# Patient Record
Sex: FEMALE | Race: WHITE
[De-identification: names, ages, dates, MRNs, and addresses within clinical notes are randomized per-mention and may not be internally consistent; named-entity substitution may affect disease eponyms.]

---

## 2019-06-27 LAB
BASOPHILS NFR BLD AUTO: 0.2 % (ref 0–2)
EOSINOPHIL NFR BLD: 1.7 % (ref 0–7)
ERYTHROCYTE [DISTWIDTH] IN BLOOD BY AUTOMATED COUNT: 13.3 % (ref 11.5–14.5)
HCT VFR BLD CALC: 46.1 % (ref 36–48)
HGB BLD-MCNC: 16.4 G/DL (ref 12–16)
IMM GRANULOCYTES NFR BLD: 0.1 % (ref 0–5)
LYMPHOCYTES NFR BLD AUTO: 21.8 % (ref 15–50)
MCH RBC QN AUTO: 33.3 PG (ref 26–34)
MCHC RBC AUTO-ENTMCNC: 35.6 G/DL (ref 31–37)
MCV RBC: 93.7 FL (ref 80–100)
MONOCYTES NFR BLD: 4.9 % (ref 2–11)
NEUTROPHILS NFR BLD AUTO: 71.3 % (ref 40–80)
PLATELET # BLD: 283 10X3/UL (ref 130–400)
PMV BLD AUTO: 9.1 FL (ref 7.4–10.4)
RBC # BLD AUTO: 4.92 10X6/UL (ref 4–5.4)
WBC # BLD AUTO: 10.3 10X3/UL (ref 4.8–10.8)

## 2019-06-28 ENCOUNTER — HOSPITAL ENCOUNTER (OUTPATIENT)
Dept: HOSPITAL 84 - D.PAN | Age: 24
Discharge: HOME | End: 2019-06-28
Attending: OBSTETRICS & GYNECOLOGY
Payer: MEDICAID

## 2019-06-28 VITALS
BODY MASS INDEX: 27.66 KG/M2 | WEIGHT: 150.32 LBS | HEIGHT: 62 IN | WEIGHT: 150.32 LBS | BODY MASS INDEX: 27.66 KG/M2 | BODY MASS INDEX: 27.66 KG/M2 | HEIGHT: 62 IN

## 2019-06-28 VITALS — SYSTOLIC BLOOD PRESSURE: 110 MMHG | DIASTOLIC BLOOD PRESSURE: 70 MMHG

## 2019-06-28 DIAGNOSIS — N83.202: Primary | ICD-10-CM

## 2019-06-28 DIAGNOSIS — R10.2: ICD-10-CM

## 2019-06-28 LAB — HCG UR QL: NEGATIVE

## 2019-06-29 ENCOUNTER — HOSPITAL ENCOUNTER (EMERGENCY)
Dept: HOSPITAL 84 - D.ER | Age: 24
Discharge: HOME | End: 2019-06-29
Payer: MEDICAID

## 2019-06-29 VITALS — SYSTOLIC BLOOD PRESSURE: 130 MMHG | DIASTOLIC BLOOD PRESSURE: 80 MMHG

## 2019-06-29 VITALS — WEIGHT: 150.32 LBS | BODY MASS INDEX: 27.66 KG/M2 | HEIGHT: 62 IN

## 2019-06-29 DIAGNOSIS — J95.89: Primary | ICD-10-CM

## 2019-06-29 DIAGNOSIS — R05: ICD-10-CM

## 2019-06-29 LAB
ALBUMIN SERPL-MCNC: 3.2 G/DL (ref 3.4–5)
ALP SERPL-CCNC: 94 U/L (ref 46–116)
ALT SERPL-CCNC: 39 U/L (ref 10–68)
AMYLASE SERPL-CCNC: 33 U/L (ref 25–115)
ANION GAP SERPL CALC-SCNC: 11.5 MMOL/L (ref 8–16)
APPEARANCE UR: (no result)
BACTERIA #/AREA URNS HPF: (no result) /HPF
BASOPHILS NFR BLD AUTO: 0.2 % (ref 0–2)
BILIRUB SERPL-MCNC: 0.15 MG/DL (ref 0.2–1.3)
BILIRUB SERPL-MCNC: NEGATIVE MG/DL
BUN SERPL-MCNC: 10 MG/DL (ref 7–18)
CALCIUM SERPL-MCNC: 8.5 MG/DL (ref 8.5–10.1)
CHLORIDE SERPL-SCNC: 106 MMOL/L (ref 98–107)
CO2 SERPL-SCNC: 26.6 MMOL/L (ref 21–32)
COLOR UR: YELLOW
CREAT SERPL-MCNC: 0.8 MG/DL (ref 0.6–1.3)
EOSINOPHIL NFR BLD: 0.7 % (ref 0–7)
ERYTHROCYTE [DISTWIDTH] IN BLOOD BY AUTOMATED COUNT: 12.8 % (ref 11.5–14.5)
GLOBULIN SER-MCNC: 3.5 G/L
GLUCOSE SERPL-MCNC: 94 MG/DL (ref 74–106)
GLUCOSE SERPL-MCNC: NEGATIVE MG/DL
HCT VFR BLD CALC: 40.8 % (ref 36–48)
HGB BLD-MCNC: 14.1 G/DL (ref 12–16)
IMM GRANULOCYTES NFR BLD: 0.2 % (ref 0–5)
KETONES UR STRIP-MCNC: NEGATIVE MG/DL
LIPASE SERPL-CCNC: 125 U/L (ref 73–393)
LYMPHOCYTES NFR BLD AUTO: 28.4 % (ref 15–50)
MCH RBC QN AUTO: 32.5 PG (ref 26–34)
MCHC RBC AUTO-ENTMCNC: 34.6 G/DL (ref 31–37)
MCV RBC: 94 FL (ref 80–100)
MONOCYTES NFR BLD: 4.4 % (ref 2–11)
MUCOUS THREADS #/AREA URNS LPF: (no result) /LPF
NEUTROPHILS NFR BLD AUTO: 66.1 % (ref 40–80)
NITRITE UR-MCNC: NEGATIVE MG/ML
OSMOLALITY SERPL CALC.SUM OF ELEC: 279 MOSM/KG (ref 275–300)
PH UR STRIP: 6 [PH] (ref 5–6)
PLATELET # BLD: 258 10X3/UL (ref 130–400)
PMV BLD AUTO: 8.8 FL (ref 7.4–10.4)
POTASSIUM SERPL-SCNC: 3.1 MMOL/L (ref 3.5–5.1)
PROT SERPL-MCNC: 6.7 G/DL (ref 6.4–8.2)
PROT UR-MCNC: NEGATIVE MG/DL
RBC # BLD AUTO: 4.34 10X6/UL (ref 4–5.4)
RBC #/AREA URNS HPF: (no result) /HPF (ref 0–5)
SODIUM SERPL-SCNC: 141 MMOL/L (ref 136–145)
SP GR UR STRIP: 1.02 (ref 1–1.02)
SQUAMOUS #/AREA URNS HPF: (no result) /HPF (ref 0–5)
UROBILINOGEN UR-MCNC: NORMAL MG/DL
WBC # BLD AUTO: 15.8 10X3/UL (ref 4.8–10.8)
WBC #/AREA URNS HPF: (no result) /HPF (ref 0–5)

## 2019-07-12 ENCOUNTER — HOSPITAL ENCOUNTER (EMERGENCY)
Dept: HOSPITAL 84 - D.ER | Age: 24
Discharge: HOME | End: 2019-07-12
Payer: MEDICAID

## 2019-07-12 VITALS — WEIGHT: 150.32 LBS | HEIGHT: 62 IN | BODY MASS INDEX: 27.66 KG/M2

## 2019-07-12 VITALS — SYSTOLIC BLOOD PRESSURE: 109 MMHG | DIASTOLIC BLOOD PRESSURE: 67 MMHG

## 2019-07-12 DIAGNOSIS — Z98.890: ICD-10-CM

## 2019-07-12 DIAGNOSIS — R11.2: ICD-10-CM

## 2019-07-12 DIAGNOSIS — K52.9: Primary | ICD-10-CM

## 2019-07-12 LAB
ALBUMIN SERPL-MCNC: 3.8 G/DL (ref 3.4–5)
ALP SERPL-CCNC: 112 U/L (ref 46–116)
ALT SERPL-CCNC: 33 U/L (ref 10–68)
AMPHETAMINES UR QL SCN: NEGATIVE QUAL
AMYLASE SERPL-CCNC: 50 U/L (ref 25–115)
ANION GAP SERPL CALC-SCNC: 12.6 MMOL/L (ref 8–16)
APPEARANCE UR: (no result)
BACTERIA #/AREA URNS HPF: (no result) /HPF
BARBITURATES UR QL SCN: NEGATIVE QUAL
BASOPHILS NFR BLD AUTO: 0.4 % (ref 0–2)
BENZODIAZ UR QL SCN: NEGATIVE QUAL
BILIRUB SERPL-MCNC: 0.27 MG/DL (ref 0.2–1.3)
BILIRUB SERPL-MCNC: NEGATIVE MG/DL
BUN SERPL-MCNC: 11 MG/DL (ref 7–18)
BZE UR QL SCN: NEGATIVE QUAL
CALCIUM SERPL-MCNC: 8.9 MG/DL (ref 8.5–10.1)
CANNABINOIDS UR QL SCN: NEGATIVE QUAL
CHLORIDE SERPL-SCNC: 106 MMOL/L (ref 98–107)
CO2 SERPL-SCNC: 24.3 MMOL/L (ref 21–32)
COLOR UR: YELLOW
CREAT SERPL-MCNC: 0.7 MG/DL (ref 0.6–1.3)
EOSINOPHIL NFR BLD: 1.7 % (ref 0–7)
ERYTHROCYTE [DISTWIDTH] IN BLOOD BY AUTOMATED COUNT: 12.8 % (ref 11.5–14.5)
GLOBULIN SER-MCNC: 3.9 G/L
GLUCOSE SERPL-MCNC: 88 MG/DL (ref 74–106)
GLUCOSE SERPL-MCNC: NEGATIVE MG/DL
HCG UR QL: NEGATIVE
HCT VFR BLD CALC: 46.2 % (ref 36–48)
HGB BLD-MCNC: 16 G/DL (ref 12–16)
IMM GRANULOCYTES NFR BLD: 0.3 % (ref 0–5)
KETONES UR STRIP-MCNC: NEGATIVE MG/DL
LIPASE SERPL-CCNC: 174 U/L (ref 73–393)
LYMPHOCYTES NFR BLD AUTO: 26.8 % (ref 15–50)
MCH RBC QN AUTO: 32.6 PG (ref 26–34)
MCHC RBC AUTO-ENTMCNC: 34.6 G/DL (ref 31–37)
MCV RBC: 94.1 FL (ref 80–100)
MONOCYTES NFR BLD: 4.7 % (ref 2–11)
MUCOUS THREADS #/AREA URNS LPF: (no result) /LPF
NEUTROPHILS NFR BLD AUTO: 66.1 % (ref 40–80)
NITRITE UR-MCNC: NEGATIVE MG/ML
OPIATES UR QL SCN: NEGATIVE QUAL
OSMOLALITY SERPL CALC.SUM OF ELEC: 275 MOSM/KG (ref 275–300)
PCP UR QL SCN: NEGATIVE QUAL
PH UR STRIP: 5 [PH] (ref 5–6)
PLATELET # BLD: 263 10X3/UL (ref 130–400)
PMV BLD AUTO: 8.9 FL (ref 7.4–10.4)
POTASSIUM SERPL-SCNC: 3.9 MMOL/L (ref 3.5–5.1)
PROT SERPL-MCNC: 7.7 G/DL (ref 6.4–8.2)
PROT UR-MCNC: NEGATIVE MG/DL
RBC # BLD AUTO: 4.91 10X6/UL (ref 4–5.4)
RBC #/AREA URNS HPF: (no result) /HPF (ref 0–5)
SODIUM SERPL-SCNC: 139 MMOL/L (ref 136–145)
SP GR UR STRIP: 1.01 (ref 1–1.02)
SQUAMOUS #/AREA URNS HPF: (no result) /HPF (ref 0–5)
UROBILINOGEN UR-MCNC: NORMAL MG/DL
WBC # BLD AUTO: 11.3 10X3/UL (ref 4.8–10.8)
WBC #/AREA URNS HPF: (no result) /HPF (ref 0–5)

## 2020-08-11 ENCOUNTER — HOSPITAL ENCOUNTER (EMERGENCY)
Dept: HOSPITAL 84 - D.ER | Age: 25
Discharge: HOME | End: 2020-08-11
Payer: MEDICAID

## 2020-08-11 VITALS — WEIGHT: 148.31 LBS | BODY MASS INDEX: 27.29 KG/M2 | HEIGHT: 62 IN

## 2020-08-11 VITALS — DIASTOLIC BLOOD PRESSURE: 74 MMHG | SYSTOLIC BLOOD PRESSURE: 129 MMHG

## 2020-08-11 DIAGNOSIS — E83.51: ICD-10-CM

## 2020-08-11 DIAGNOSIS — D72.829: ICD-10-CM

## 2020-08-11 DIAGNOSIS — N93.9: ICD-10-CM

## 2020-08-11 DIAGNOSIS — O03.9: Primary | ICD-10-CM

## 2020-08-11 LAB
ALBUMIN SERPL-MCNC: 3.5 G/DL (ref 3.4–5)
ALP SERPL-CCNC: 107 U/L (ref 30–120)
ALT SERPL-CCNC: 21 U/L (ref 10–68)
ANION GAP SERPL CALC-SCNC: 8.3 MMOL/L (ref 8–16)
BACTERIA #/AREA URNS HPF: (no result) /HPF
BASOPHILS NFR BLD AUTO: 0.2 % (ref 0–2)
BILIRUB SERPL-MCNC: 0.44 MG/DL (ref 0.2–1.3)
BILIRUB SERPL-MCNC: NEGATIVE MG/DL
BUN SERPL-MCNC: 12 MG/DL (ref 7–18)
CALCIUM SERPL-MCNC: 8 MG/DL (ref 8.5–10.1)
CHLORIDE SERPL-SCNC: 106 MMOL/L (ref 98–107)
CO2 SERPL-SCNC: 29.6 MMOL/L (ref 21–32)
CREAT SERPL-MCNC: 0.7 MG/DL (ref 0.6–1.3)
EOSINOPHIL NFR BLD: 1.2 % (ref 0–7)
ERYTHROCYTE [DISTWIDTH] IN BLOOD BY AUTOMATED COUNT: 12.8 % (ref 11.5–14.5)
GLOBULIN SER-MCNC: 3.4 G/L
GLUCOSE SERPL-MCNC: 77 MG/DL (ref 74–106)
GLUCOSE SERPL-MCNC: NEGATIVE MG/DL
HCG SERPL-ACNC: 0 MIU/ML
HCT VFR BLD CALC: 43.1 % (ref 36–48)
HGB BLD-MCNC: 14.5 G/DL (ref 12–16)
IMM GRANULOCYTES NFR BLD: 0.3 % (ref 0–5)
KETONES UR STRIP-MCNC: NEGATIVE MG/DL
LYMPHOCYTES NFR BLD AUTO: 14.1 % (ref 15–50)
MCH RBC QN AUTO: 32.7 PG (ref 26–34)
MCHC RBC AUTO-ENTMCNC: 33.6 G/DL (ref 31–37)
MCV RBC: 97.3 FL (ref 80–100)
MONOCYTES NFR BLD: 5.5 % (ref 2–11)
NEUTROPHILS NFR BLD AUTO: 78.7 % (ref 40–80)
NITRITE UR-MCNC: NEGATIVE MG/ML
OSMOLALITY SERPL CALC.SUM OF ELEC: 277 MOSM/KG (ref 275–300)
PH UR STRIP: 5 [PH] (ref 5–6)
PLATELET # BLD: 258 10X3/UL (ref 130–400)
PMV BLD AUTO: 8.5 FL (ref 7.4–10.4)
POTASSIUM SERPL-SCNC: 3.9 MMOL/L (ref 3.5–5.1)
PROT SERPL-MCNC: 6.9 G/DL (ref 6.4–8.2)
RBC # BLD AUTO: 4.43 10X6/UL (ref 4–5.4)
RBC #/AREA URNS HPF: (no result) /HPF (ref 0–5)
SODIUM SERPL-SCNC: 140 MMOL/L (ref 136–145)
SQUAMOUS #/AREA URNS HPF: (no result) /HPF (ref 0–5)
UROBILINOGEN UR-MCNC: NORMAL MG/DL
WBC # BLD AUTO: 14.5 10X3/UL (ref 4.8–10.8)
WBC #/AREA URNS HPF: (no result) /HPF

## 2020-09-18 ENCOUNTER — HOSPITAL ENCOUNTER (OUTPATIENT)
Dept: HOSPITAL 84 - D.OPS | Age: 25
Discharge: HOME | End: 2020-09-18
Attending: PODIATRIST
Payer: MEDICAID

## 2020-09-18 VITALS
HEIGHT: 62 IN | BODY MASS INDEX: 26.5 KG/M2 | BODY MASS INDEX: 26.5 KG/M2 | WEIGHT: 144 LBS | HEIGHT: 62 IN | WEIGHT: 144 LBS

## 2020-09-18 VITALS — DIASTOLIC BLOOD PRESSURE: 76 MMHG | SYSTOLIC BLOOD PRESSURE: 111 MMHG

## 2020-09-18 DIAGNOSIS — M25.774: ICD-10-CM

## 2020-09-18 DIAGNOSIS — M21.621: Primary | ICD-10-CM

## 2020-09-18 LAB
ERYTHROCYTE [DISTWIDTH] IN BLOOD BY AUTOMATED COUNT: 12.8 % (ref 11.5–14.5)
HCG SERPL-ACNC: POSITIVE M[IU]/ML
HCT VFR BLD CALC: 39.8 % (ref 36–48)
HGB BLD-MCNC: 13.3 G/DL (ref 12–16)
MCH RBC QN AUTO: 32.4 PG (ref 26–34)
MCHC RBC AUTO-ENTMCNC: 33.4 G/DL (ref 31–37)
MCV RBC: 97.1 FL (ref 80–100)
PLATELET # BLD: 273 10X3/UL (ref 130–400)
PMV BLD AUTO: 8.3 FL (ref 7.4–10.4)
RBC # BLD AUTO: 4.1 10X6/UL (ref 4–5.4)
WBC # BLD AUTO: 12.9 10X3/UL (ref 4.8–10.8)

## 2020-09-18 NOTE — OP
PATIENT NAME:  AMADA RUDD                          MEDICAL RECORD: [id number]
:[birth year]                                             LOCATION:D.OPS          
                                                         ADMISSION DATE:        
SURGEON:  GAVINO RODAS             
 
 
DATE OF OPERATION:  2020
 
SURGEON:  Gavino Rodas DPM
 
PREOPERATIVE DIAGNOSES:
1. Tailor's bunion, right foot.
2. Accessory ossicle, right great toe.
 
POSTOPERATIVE DIAGNOSES:
1.  Tailor's bunion, right foot.
2.  Accessory ossicle, right great toe.
 
PROCEDURE:
1. Tailor's bunionectomy, right foot.
2. Partial ostectomy fifth metatarsal head, right foot.
3. Excision of accessory ossicle, right great toe.
 
ANESTHESIA:  Local with monitored anesthesia care.
 
HEMOSTASIS:  Pneumatic ankle tourniquet inflated to 250 mmHg.
 
ESTIMATED BLOOD LOSS:  Minimal.
 
MATERIALS:  A 3-0 Vicryl and 4-0 nylon.
 
INJECTABLES:  A 20 cc of 0.5% bupivacaine plain.
 
The patient has longstanding history of pain associated with a tailor's bunion
deformity on the right foot and also a painful callus secondary to an accessory
ossicle in the plantar surface of the right great toe.  We have discussed the
proposed surgeries.  Risks and benefits were discussed.  Complications were
reviewed.  She was appropriately consented for the above-mentioned procedures.
 
DESCRIPTION OF PROCEDURE:  The patient was brought in the operating room and
placed on the operating table in supine position.  A timeout was called with Dr. Rodas, who identified the patient, the surgical site, and surgery to be
performed.  Once appropriate anesthesia was obtained, the foot was prepped and
draped in the usual aseptic manner.
 
Attention was directed to the lateral aspect of the right foot where a 3-cm
linear incision was made just over the lateral aspect of the fifth metatarsal
head.  This incision was carried deep to soft tissue with care being taken to
retract all vital neurovascular structures.  All bleeders were cauterized along
the way.  The periosteum was then reflected from the fifth metatarsophalangeal
joint, thus exposing the hypertrophied lateral eminence of the fifth metatarsal
head.  The plantar condyles were also noted to be hypertrophied.  Next,
utilizing a sagittal saw, the hypertrophied condyles were removed as well as the
hypertrophied lateral eminence.  All sharp bone edges were smoothed with a rasp.
 The surgical site was then irrigated with copious amounts of normal sterile
saline via bulb syringe.  The periosteum was then reapproximated and coapted
using 3-0 Vicryl.  The subQ was then reapproximated and coapted using 3-0 Vicryl
and the skin was reapproximated and coapted with 4-0 nylon.
 
 
 
OPERATIVE REPORT                               [id number]    AMADA RUDD        
 
 
 
PROCEDURE 2:  Excision of accessory ossicle, right foot.  Attention was directed
to the medial aspect of the right great toe where a 3-cm linear incision was
made.  This incision was carried deep to soft tissue with care being taken to
retract all vital neurovascular structures.  All bleeders were cauterized along
the way.  As the incision was deepened, an accessory ossicle was noted directly
underneath the callus.  This accessory ossicle was then removed utilizing both
sharp and blunt dissection.  The surgical site was then investigated for any
remaining pathological tissue and none was noted.  The ossicle was sent to
pathology for further analysis.  The surgical site was then irrigated with
copious amounts of normal sterile saline via bulb syringe.
 
The periosteum was reapproximated and coapted using 3-0 Vicryl.  The subQ was
reapproximated and coapted using 3-0 Vicryl.  The skin was reapproximated and
closed with a 4-0 nylon.  A dressing consisting of Xeroform, 4 x 4's, Kerlix,
and Ace bandage was applied to the to the right foot.  Capillary refill time
status post procedure was instantaneous.  The patient tolerated the procedure
and anesthesia well.  She left the operating room with vital signs stable and
capillary refill time intact.
 
The patient was discharged home with instructions to ice and elevate the right
foot.  She was provided a prescription for Norco 7.5/325.  She has my cell phone
number for any after hours difficulties and there are no complications with this
procedure.
 
TRANSINT:[id number] Voice Confirmation ID: [id number] DOCUMENT ID: [id number]
                                           
                                           GAVINO RODAS             
 
 
 
 
 
 
 
 
 
 
 
 
 
 
 
 
 
 
 
CC:                                                             [id number]
DICTATION DATE: 20     :     20      [facility] 
                                                                      20
[id number] [place] [address]

## 2021-02-24 ENCOUNTER — HOSPITAL ENCOUNTER (OUTPATIENT)
Dept: HOSPITAL 84 - D.LDO | Age: 26
LOS: 1 days | Discharge: HOME | End: 2021-02-25
Attending: OBSTETRICS & GYNECOLOGY
Payer: MEDICAID

## 2021-02-24 VITALS — BODY MASS INDEX: 26.4 KG/M2

## 2021-02-24 DIAGNOSIS — O36.8190: Primary | ICD-10-CM

## 2021-02-24 LAB
BACTERIA #/AREA URNS HPF: (no result) HPF
BILIRUB SERPL-MCNC: NEGATIVE MG/DL
FIBRONECTIN FETAL VAG QL: NEGATIVE
KETONES UR STRIP-MCNC: NEGATIVE MG/DL
NITRITE UR-MCNC: NEGATIVE MG/ML
PH UR STRIP: 6 [PH] (ref 5–8)
SQUAMOUS #/AREA URNS HPF: (no result) HPF (ref 0–4)
UROBILINOGEN UR-MCNC: NORMAL MG/DL (ref ?–2)
WBC #/AREA URNS HPF: (no result) HPF (ref 0–4)

## 2021-03-30 ENCOUNTER — HOSPITAL ENCOUNTER (OUTPATIENT)
Dept: HOSPITAL 84 - D.LDO | Age: 26
Discharge: HOME | End: 2021-03-30
Attending: OBSTETRICS & GYNECOLOGY
Payer: COMMERCIAL

## 2021-03-30 VITALS — BODY MASS INDEX: 26.4 KG/M2

## 2021-03-30 DIAGNOSIS — O26.899: Primary | ICD-10-CM

## 2021-04-07 ENCOUNTER — HOSPITAL ENCOUNTER (OUTPATIENT)
Dept: HOSPITAL 84 - D.US | Age: 26
Discharge: HOME | End: 2021-04-07
Attending: OBSTETRICS & GYNECOLOGY
Payer: MEDICAID

## 2021-04-07 VITALS — BODY MASS INDEX: 26.4 KG/M2

## 2021-04-07 DIAGNOSIS — R22.41: Primary | ICD-10-CM

## 2021-04-14 ENCOUNTER — HOSPITAL ENCOUNTER (OUTPATIENT)
Dept: HOSPITAL 84 - D.LDO | Age: 26
Discharge: HOME | End: 2021-04-14
Attending: OBSTETRICS & GYNECOLOGY
Payer: COMMERCIAL

## 2021-04-14 VITALS — BODY MASS INDEX: 26.4 KG/M2

## 2021-04-14 DIAGNOSIS — O47.9: Primary | ICD-10-CM

## 2021-04-27 ENCOUNTER — HOSPITAL ENCOUNTER (OUTPATIENT)
Dept: HOSPITAL 84 - D.LDO | Age: 26
Discharge: LEFT BEFORE DISCHARGE | End: 2021-04-27
Attending: OBSTETRICS & GYNECOLOGY
Payer: COMMERCIAL

## 2021-04-27 VITALS — BODY MASS INDEX: 26.4 KG/M2

## 2021-04-27 DIAGNOSIS — O47.9: Primary | ICD-10-CM

## 2021-05-05 ENCOUNTER — HOSPITAL ENCOUNTER (INPATIENT)
Dept: HOSPITAL 84 - D.LDO | Age: 26
LOS: 3 days | Discharge: HOME | End: 2021-05-08
Attending: OBSTETRICS & GYNECOLOGY | Admitting: OBSTETRICS & GYNECOLOGY
Payer: MEDICAID

## 2021-05-05 VITALS — BODY MASS INDEX: 34.78 KG/M2 | HEIGHT: 62 IN | BODY MASS INDEX: 34.78 KG/M2 | WEIGHT: 189 LBS

## 2021-05-05 DIAGNOSIS — Z3A.38: ICD-10-CM

## 2021-05-05 DIAGNOSIS — R51.9: ICD-10-CM

## 2021-05-05 DIAGNOSIS — K21.9: ICD-10-CM

## 2021-05-06 VITALS — DIASTOLIC BLOOD PRESSURE: 79 MMHG | SYSTOLIC BLOOD PRESSURE: 118 MMHG

## 2021-05-06 VITALS — SYSTOLIC BLOOD PRESSURE: 138 MMHG | DIASTOLIC BLOOD PRESSURE: 79 MMHG

## 2021-05-06 LAB
ERYTHROCYTE [DISTWIDTH] IN BLOOD BY AUTOMATED COUNT: 13.5 % (ref 11.5–14.5)
HCT VFR BLD CALC: 37.9 % (ref 36–48)
HGB BLD-MCNC: 12.7 G/DL (ref 12–16)
MCH RBC QN AUTO: 31.2 PG (ref 26–34)
MCHC RBC AUTO-ENTMCNC: 33.5 G/DL (ref 31–37)
MCV RBC: 93.1 FL (ref 80–100)
PLATELET # BLD: 211 10X3/UL (ref 130–400)
PMV BLD AUTO: 8.7 FL (ref 7.4–10.4)
RBC # BLD AUTO: 4.07 10X6/UL (ref 4–5.4)
WBC # BLD AUTO: 12.5 10X3/UL (ref 4.8–10.8)

## 2021-05-06 NOTE — NUR
pain  medication administered per pt request and md orders. see emar.
juice,coke and ice provided per pt request.

## 2021-05-06 NOTE — NUR
PT AMBULATORY TO BR, GAIT STEADY, PT VOIDS PER SELF, PERICARE DONE PER SELF,
PERIPADS/PANTIES PROVIDED, ALONG WITH TUCKS PADS AND DERMAPLAST SPRAY FOR
COMFORT.  SRUP X2, CALL LIGHT AND PHONE WITHIN REACH.  SIG OTHER AT BEDSIDE.

## 2021-05-06 NOTE — NUR
ORIENT TO ROOM, BATHROOM AND CALL LIGHT.  FOB SHOWN NURSERY ENTRANCE.  PT
DENIES PAIN OR NEEDS AT THIS TIME.  INFANT CRIB AT BEDSIDE.

## 2021-05-07 VITALS — DIASTOLIC BLOOD PRESSURE: 62 MMHG | SYSTOLIC BLOOD PRESSURE: 105 MMHG

## 2021-05-07 VITALS — DIASTOLIC BLOOD PRESSURE: 55 MMHG | SYSTOLIC BLOOD PRESSURE: 126 MMHG

## 2021-05-07 LAB
BASOPHILS NFR BLD AUTO: 0.3 % (ref 0–2)
EOSINOPHIL NFR BLD: 1.6 % (ref 0–7)
ERYTHROCYTE [DISTWIDTH] IN BLOOD BY AUTOMATED COUNT: 13.6 % (ref 11.5–14.5)
HCT VFR BLD CALC: 32.1 % (ref 36–48)
HGB BLD-MCNC: 10.6 G/DL (ref 12–16)
IMM GRANULOCYTES NFR BLD: 0.4 % (ref 0–5)
LYMPHOCYTES # BLD: 2.36 10X3/UL (ref 1.18–3.74)
LYMPHOCYTES NFR BLD AUTO: 21.9 % (ref 15–50)
MCH RBC QN AUTO: 30.5 PG (ref 26–34)
MCHC RBC AUTO-ENTMCNC: 33 G/DL (ref 31–37)
MCV RBC: 92.2 FL (ref 80–100)
MONOCYTES NFR BLD: 7.3 % (ref 2–11)
NEUTROPHILS # BLD: 7.38 10X3/UL (ref 1.56–6.13)
NEUTROPHILS NFR BLD AUTO: 68.5 % (ref 40–80)
PLATELET # BLD: 167 10X3/UL (ref 130–400)
PMV BLD AUTO: 8.7 FL (ref 7.4–10.4)
RBC # BLD AUTO: 3.48 10X6/UL (ref 4–5.4)
WBC # BLD AUTO: 10.8 10X3/UL (ref 4.8–10.8)

## 2021-05-07 NOTE — NUR
ROUNDS COMPLETED, DENIES NEEDS/CONCERNS, PT AMBULATORY IN ROOM, SO AT BS.  C/L
IN EASY REACH OF PT.  NAD NOTED.  CONTINUE TO MONITOR.

## 2021-05-07 NOTE — NUR
CALLED TO ROOM,  PT STATES THAT SHE JUST TRIED TO GET UP TO BATHROOM BUT IS
VERY DIZZY, HAS INCREASE NAUSEA AND SEEING FLASHING SPOTS.  ATTEMPT TO
REASSURE HER THAT WAS UNHARMFUL SIDE EFFECT BUT SHE ASK THAT IV INFUSION BE
STOPPED.  THIS IS DONE AND ANESTHESIA CALLED TO COME BACK AND ANSWER ANY
QUESTIONS PT MAY HAVE.  SHE IS PROVIDED WITH PRINTED INFO ON MEDS AND BLOOD
PATCH.

## 2021-05-07 NOTE — NUR
JUDSON LOPEZ CRNA TO BEDSIDE NEW ORDERS RECEIVED FOR IV MED TO POSSIBLY TREAT
HEADACHE. PT STATES THAT HER BACK IS VERY SORE AND SHE DOES NOT WANT TO DO
BLOOD PATCH AT THIS TIME.

## 2021-05-07 NOTE — NUR
RESTING ON HER RIGHT SIDE WITH LIGHT OUT, RATES PAIN AT 3/10 AND DENIES NEEDS.
SPOUSE AT BEDSIDE HOLDING INFANT. CALL LIGHT IN REACH.

## 2021-05-07 NOTE — NUR
PM ASSESSMENT COMPLETED, VSS, AFEBRILE, RESP EVEN AND UNLABORED, HEART RRR,
ABD SOFT AND NONTENDER, FF AT U/2 AND MIDLINE, LOCHIA RUBRA LIGHT AMOUNT, NO
CLOTS, VOIDING WITHOUT DIFFICULTY, JNUIOR FREELY B, PEDAL PULSES STRONG/=/+2 B,
NEGATIVE MATHEW'S SIGN B LE.  REVIEWED POC, QUESTIONS ANSWERED, WHITEBOARD
UPDATED.  DENIES NEEDS/CONCERNS AT PRESENT, C/L IN EASY REACH, BED IN LOW
POSITION, BED BRAKES LOCKED, SR UP X2.  CONTINUE TO MONITOR.

## 2021-05-07 NOTE — NUR
PATIENT RESTING QUIETLY ON HER LEFT SIDE, RESPIRATIONS EVEN AND NON LABORED.
NO DISTRESS NOTED. WILL CONTINUE TO MONITOR.

## 2021-05-07 NOTE — NUR
FEEDING INFANT, RATES PAIN AT 5/10 MEDS GIVEN AS SCANNED TO EMAR.  ALSO GIVEN
ZOFRAN FOR CONTINUE COMPLAINT OF NAUSEA. PT DENIES WANTING TO HAVE IV MED FOR
HA RESTARTED AND GOES ON TO SAY THAT HER HEADACHE IS "MUCH BETTER" SPOUSE AT
BEDSIDE.

## 2021-05-07 NOTE — NUR
PT WALKING AROUND THE ROOM, STATES THAT SHE IS GOING TO THE BATHROOM.
PADS,PANTIES, DERMOPLAST AND TUCKS PADS ALL IN THE BATHROOM. PT DENIES PAIN OR
NEEDS AT THIS TIME.

## 2021-05-07 NOTE — NUR
IN TO CHECK ON PATIENT, PT SITTING UP IN BED. INFANT AT BEDSIDE IN FOB'S ARMS.
ICE, PADS AND PANTIES PROVIDED PER PT REQUEST. TUCKS PADS AND DERMOPLAST
PROVIDED AND PT INSTRUCTED ON USE. PT VERBALIZES UNDERSTANDING, NO FURTHER
NEEDS IDENTIFIED. WILL CONTINUE TO MONITOR.

## 2021-05-07 NOTE — NUR
AM ASSESSMENT COMPLETED AS CHARTED TO FLOWSHEET.  RATES PAIN AT 5/10 AND MEDS
GIVEN AS SCANNED TO EMAR.  PT IS COMPLAINING OF HEADACHE THAT SHE SAYS STARTED
WHEN SHE GOT UP WALKING AFTER DELIVERY AND HAS NEVER GONE AWAY, SHE STATES
THAT IT HAS DECREASED BUT IS STILL PRESENT. PT AND SPOUSE ALSO EXPRESS CONCERN
OVER INCREASED SWELLING IN LOWER LEFT LEG, PER PT LAST NIGHT HER TOES AND
BOTTOM OF LEFT FOOT WERE A BLUISH COLOR,  NOTICABLE SWELLING IS PRESENT AT
TIME OF ASSESSMENT WITH 1+PITTING EDEMA NOTED, SHE DOES HAVE +PULSES AND
DENIES PAIN WITH TOUCH OR WHEN WALKING. ENCOURAGED HER TO SHOW DOCTOR THAT
ROUNDS THIS AM. FUNDUS FIRM WITH MASSAGE AT U/1 WITH LIGHT BLEEDING.
ENCOURAGED HER TO INCLUDE WATER WITH HER INTACT. WILL NOTIFIY ANESTHESIA FOR
EVALUATION OF HEADACHE. CALL LIGHT IS WITH IN HR REACH, SIDE RAILS UP X 2.

## 2021-05-07 NOTE — NUR
ROUNDS COMPLETED, PT REQUESTS PRN PAIN MEDICATION, SAME PROVIDED, DENIES OTHER
NEEDS/REQUESTS.  C/L IN EASY REACH, CONTINUE TO MONITOR.

## 2021-05-07 NOTE — NUR
DR DA SILVA ON UNIT WITH VERBAL REPORT OF PT C/O HEADACHE AND HER CONCERNS OF
LEFT LEG SWELLING. MD TO BEDSIDE

## 2021-05-07 NOTE — NUR
PAIN MEDICATION ADMINISTERED PER PT REQUEST AND MD ORDERS. NO FURTHER NEEDS
IDENTIFED. INFANT REMAINS AT BEDSIDE IN THE ARMS OF THE FOB. WILL CONTINUE TO
MONITOR.

## 2021-05-07 NOTE — NUR
US NOTIFIED FOR DOPPLER OF LEFT LEG.  DR MORENO CALLED TO SAY THAT SOMEONE FROM
ANESTHESIA WOULD BE TO SPEAK WITH HER ABOUT HEADACHE.

## 2021-05-08 VITALS — DIASTOLIC BLOOD PRESSURE: 69 MMHG | SYSTOLIC BLOOD PRESSURE: 112 MMHG

## 2021-05-08 VITALS — DIASTOLIC BLOOD PRESSURE: 70 MMHG | SYSTOLIC BLOOD PRESSURE: 109 MMHG

## 2021-05-08 LAB
BASOPHILS NFR BLD AUTO: 0.3 % (ref 0–2)
EOSINOPHIL NFR BLD: 2.8 % (ref 0–7)
ERYTHROCYTE [DISTWIDTH] IN BLOOD BY AUTOMATED COUNT: 13.5 % (ref 11.5–14.5)
HCT VFR BLD CALC: 35.4 % (ref 36–48)
HGB BLD-MCNC: 11.6 G/DL (ref 12–16)
IMM GRANULOCYTES NFR BLD: 0.3 % (ref 0–5)
LYMPHOCYTES # BLD: 2.17 10X3/UL (ref 1.18–3.74)
LYMPHOCYTES NFR BLD AUTO: 21.4 % (ref 15–50)
MCH RBC QN AUTO: 30.8 PG (ref 26–34)
MCHC RBC AUTO-ENTMCNC: 32.8 G/DL (ref 31–37)
MCV RBC: 93.9 FL (ref 80–100)
MONOCYTES NFR BLD: 4.3 % (ref 2–11)
NEUTROPHILS # BLD: 7.17 10X3/UL (ref 1.56–6.13)
NEUTROPHILS NFR BLD AUTO: 70.9 % (ref 40–80)
PLATELET # BLD: 211 10X3/UL (ref 130–400)
PMV BLD AUTO: 8.9 FL (ref 7.4–10.4)
RBC # BLD AUTO: 3.77 10X6/UL (ref 4–5.4)
WBC # BLD AUTO: 10.1 10X3/UL (ref 4.8–10.8)

## 2021-05-08 NOTE — NUR
ROUNDS COMPLETED, PT LYING RIGHT LATERAL POSITION, HOB ELEVATED 20 DEGREES,
RESP EVEN AND UNLABORED, NAD NOTED.  C/L IN EASY REACH OF PT.  WILL MONITOR
FOR CHANGES.

## 2021-05-08 NOTE — NUR
DISCHARGE INSTRUCTIONS EXPLAINED TO PT, COPIES GIVEN TO PT, NO SCRIPTS ON
CHART TO GIVE TO PT, AS VERIFIED WITH DR. MARITO GRACE THAT SHE TOLD PT TO
TAKE IBUPROFEN OTC AT HOME.  PT DENIES ALL QUESTIONS, PT PROVIDED WITH
PERIPADS/PANTIES TO TAKE HOME.

## 2021-05-08 NOTE — NUR
DR. DEVI HAS REVIEWED LIVER U/S, CBC RESULTS.  VERBAL DISCHARGE ORDER
RECEIVED FROM DR. DEVI FOR PT TO FOLLOW UP IN THE CLINIC WITH DR. LOPEZ
IN 4 WEEKS PP.

## 2021-05-08 NOTE — NUR
500 ML BOLUS COMPLETED.  IV SL.  PT SITTING UP IN THE BED, HOLDING INFANT,
WITH SIG OTHER IN THE BED AS WELL.  PLAN OF CARE DISCUSSED WITH PT, AND SIG
OTHER.  MD HAS ORDERED CBC AND ULTRASOUND.

## 2021-05-08 NOTE — NUR
AFTER DR. DEVI MADE ROUNDS ON PT, ORDER RECEIVED TO OBTAIN U/S OF THE LIVER
AS PT IS C/O UPPER RIGHT QUADRANT PAIN.  SEE EMAR FOR ANY AND ALL MEDICATION
ADM TO PT.  PT SERVED LARGE ICE WATER, AS PT ALSO C/O DIZZINESS UPON STANDING
AS PT STATES THIS STARTED ABOUT AN HOUR AGO.  SRUP X2, CALL LIGHT AND PHONE
WITHIN REACH.

## 2021-05-08 NOTE — NUR
PT TAKEN OUT BY WHEELCHAIR IN STABLE CONDITION, BY TANJA MISTRY RN WITH INFANT
IN CARSEAT, WITH SIG OTHER CARRYING PERSONAL ITEMS.

## 2021-05-08 NOTE — NUR
PT REQUESTS PAIN MEDICATION, SEE EMAR FOR ALL MEDS ADM BY THIS RN. SRUP X2,
CALL LIGHT AND PHONE WITHIN REACH.

## 2021-05-08 NOTE — NUR
ROUNDS COMPLETED INFANT IN ARMS AND BREASTFEEDING, REINFORCED BREASTFEEDING
EDUCATION, QUESTIONS ANSWERED.  SO AT BS AND SUPPORTIVE.  C/L IN EASY REACH.
SODA PROVIDED UPON REQUEST.  NO OTHER NEEDS VOICED AT THIS TIME, CONTINUE TO
MONITOR.

## 2021-05-08 NOTE — NUR
PT REQUESTING PRN PAIN MEDS FOR PAIN RATED 5-6 OUT OF 10 ON NUMERIC PAIN
SCALE; SAME PROVIDED.  INFANT IN ARMS; BREASTFEEDING, SO AT BS FOR PT SUPPORT.
 C/L IN EASY REACH.  CUP OF SODA PROVIDED UPON REQUEST.

## 2021-05-19 ENCOUNTER — HOSPITAL ENCOUNTER (EMERGENCY)
Dept: HOSPITAL 84 - D.ER | Age: 26
Discharge: HOME | End: 2021-05-19
Payer: COMMERCIAL

## 2021-05-19 VITALS — SYSTOLIC BLOOD PRESSURE: 112 MMHG | DIASTOLIC BLOOD PRESSURE: 74 MMHG

## 2021-05-19 VITALS
BODY MASS INDEX: 31.35 KG/M2 | BODY MASS INDEX: 31.35 KG/M2 | WEIGHT: 170.36 LBS | HEIGHT: 62 IN | WEIGHT: 170.36 LBS | HEIGHT: 62 IN

## 2021-05-19 DIAGNOSIS — K21.9: ICD-10-CM
